# Patient Record
(demographics unavailable — no encounter records)

---

## 2024-11-19 NOTE — PLAN
[FreeTextEntry1] : collagenase, DD qd may need to consider skin graft. F/u- 1 wk  time spent 35 mins.

## 2024-11-19 NOTE — PHYSICAL EXAM
[JVD] : no jugular venous distention  [Normal Thyroid] : the thyroid was normal [Normal Breath Sounds] : Normal breath sounds [Normal Heart Sounds] : normal heart sounds [Normal Rate and Rhythm] : normal rate and rhythm [1+] : left 1+ [2+] : left 2+ [Ankle Swelling (On Exam)] : present [Ankle Swelling On The Right] : mild [Abdomen Masses] : No abdominal massess [Abdomen Tenderness] : ~T ~M No abdominal tenderness [Tender] : nontender [Enlarged] : not enlarged [Alert] : alert [Oriented to Person] : oriented to person [Oriented to Place] : oriented to place [Oriented to Time] : oriented to time [Calm] : calm [de-identified] : adult WM, NAD, alert, Ox3 [FreeTextEntry1] : Left anterior calf - gastrocnemius muscle [FreeTextEntry2] : 2.0 [FreeTextEntry3] : 4.2 [FreeTextEntry4] : 0.2 [de-identified] : Junie Fengnmshavon [de-identified] : 10% - Edges of 1 - 9 o'clock [de-identified] : 45% [de-identified] : 45% [de-identified] : Mechanically cleansed with sterile gauze and 0.9% normal saline. Dry Dressing  CIRCULATION -- Dorsalis Pedis: Doppler = R Palpable, Regular, 2+ L Palpable, Regular, 2+ -- Posterior Tibialis: Manual = R Palpable, Regular, 2+ L Palpable, Regular, 2+  Extremity Color: Pigmented Extremity Temperature: Cool Capillary Refill: < 3 seconds bilaterally [TWNoteComboBox4] : None [TWNoteComboBox5] : No [TWNoteComboBox6] : Traumatic [de-identified] : No [de-identified] : Erythema [de-identified] : None [de-identified] : False [de-identified] : Yes

## 2024-11-19 NOTE — VITALS
[] : No [de-identified] : "Very Sensitive" [FreeTextEntry3] : 10/10 [FreeTextEntry1] : "Not touching" [FreeTextEntry2] : "Touching it" [FreeTextEntry4] : Offload [FreeTextEntry5] : Initial Visit - Meds Reconciled

## 2024-11-19 NOTE — ASSESSMENT
[FreeTextEntry2] : Elevation & Low Sodium Compliance Infection Prevention Maintain Optimal Skin Integrity to High Pressure Areas Nutrition & Wound Healing Offloading the stress on skin structures and decreasing potential pathologic biomechanical influences. Promote & Restore Optimal Skin Integrity Protect & Guard Wound Site Wound Care (Dressing Changes) Vascular consult / Vascular studies. [FreeTextEntry3] : Initial Visit [FreeTextEntry4] : MD assessed wound and advised patient to use Santyl daily. Pt can shower daily. F/U 1 week
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
## 2024-11-26 NOTE — PHYSICAL EXAM
[Normal Thyroid] : the thyroid was normal [Normal Breath Sounds] : Normal breath sounds [Normal Heart Sounds] : normal heart sounds [Normal Rate and Rhythm] : normal rate and rhythm [Alert] : alert [Oriented to Person] : oriented to person [Oriented to Place] : oriented to place [Oriented to Time] : oriented to time [Calm] : calm [JVD] : no jugular venous distention  [Abdomen Masses] : No abdominal massess [Abdomen Tenderness] : ~T ~M No abdominal tenderness [Tender] : nontender [Enlarged] : not enlarged [de-identified] : adult WM, NAD, alert, Ox3. [4 x 4] : 4 x 4  [FreeTextEntry1] : Left posterior leg  [FreeTextEntry2] : 3.6 [FreeTextEntry3] : 1.8 [FreeTextEntry4] : 0.3 [de-identified] : Serous/sanguinous [de-identified] : Burn  [de-identified] : Mild  [de-identified] : 15% [de-identified] : collagenase  [de-identified] : Mechanically cleansed with sterile gauze and normal saline. Cloth tape  [TWNoteComboBox4] : Small [TWNoteComboBox6] : Other [de-identified] : Erythema [de-identified] : None [de-identified] : <20% [de-identified] : >75% [de-identified] : Yes [de-identified] : Daily [de-identified] : Primary Dressing

## 2024-11-26 NOTE — PLAN
[FreeTextEntry1] : collagenase, EDENILSON qd get authorization for sharp debridement. f/u 1 wk  time spent 25 mins.

## 2024-11-26 NOTE — VITALS
[Pain related to present condition?] : The patient's  pain is related to present condition. [] : No [de-identified] : 3/10  [FreeTextEntry3] : Left leg wound.  [FreeTextEntry1] : Protective dressing  [FreeTextEntry2] : Direct contact [FreeTextEntry4] : Protective dressing
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
## 2024-11-26 NOTE — ASSESSMENT
[Verbal] : Verbal [Written] : Written [Demo] : Demo [Patient] : Patient [Family member] : Family member [Good - alert, interested, motivated] : Good - alert, interested, motivated [Verbalizes knowledge/Understanding] : Verbalizes knowledge/understanding [Dressing changes] : dressing changes [Skin Care] : skin care [Signs and symptoms of infection] : sign and symptoms of infection [Nutrition] : nutrition [How and When to Call] : how and when to call [Pain Management] : pain management [Patient responsibility to plan of care] : patient responsibility to plan of care [] : Yes [FreeTextEntry2] : Infection prevention Localized wound care  Goal remaining pain free regarding wounds Enzymatic debridement   [FreeTextEntry4] : Auth submitted for sharps debridement  Patient has supplies at home and preforms his own dressing changes. follow up in 1 week  [Stable] : stable [Home] : Home [Ambulatory] : Ambulatory [Not Applicable - Long Term Care/Home Health Agency] : Long Term Care/Home Health Agency: Not Applicable

## 2024-12-03 NOTE — ASSESSMENT
[Verbal] : Verbal [Written] : Written [Demo] : Demo [Patient] : Patient [Family member] : Family member [Good - alert, interested, motivated] : Good - alert, interested, motivated [Verbalizes knowledge/Understanding] : Verbalizes knowledge/understanding [Dressing changes] : dressing changes [Skin Care] : skin care [Signs and symptoms of infection] : sign and symptoms of infection [Nutrition] : nutrition [How and When to Call] : how and when to call [Pain Management] : pain management [Patient responsibility to plan of care] : patient responsibility to plan of care [] : Yes [FreeTextEntry2] : Infection prevention Localized wound care  Goal remaining pain free regarding wounds Collagen matrix therapy  [FreeTextEntry4] : Small amount of supplies provided for patient. Supply order placed. Pt preforms his own dressing changes.  No debridement per MD due to improvement.  Follow up in 2 weeks  [Stable] : stable [Home] : Home [Ambulatory] : Ambulatory [Not Applicable - Long Term Care/Home Health Agency] : Long Term Care/Home Health Agency: Not Applicable

## 2024-12-03 NOTE — PHYSICAL EXAM
[Normal Thyroid] : the thyroid was normal [Normal Breath Sounds] : Normal breath sounds [Normal Heart Sounds] : normal heart sounds [Normal Rate and Rhythm] : normal rate and rhythm [Ankle Swelling (On Exam)] : present [Ankle Swelling On The Left] : of the left ankle [Ankle Swelling On The Right] : mild [Alert] : alert [Oriented to Person] : oriented to person [Oriented to Place] : oriented to place [Oriented to Time] : oriented to time [Calm] : calm [JVD] : no jugular venous distention  [Abdomen Masses] : No abdominal massess [Abdomen Tenderness] : ~T ~M No abdominal tenderness [Tender] : nontender [Enlarged] : not enlarged [de-identified] : adult WM, NAD, alert, Ox3. [4 x 4] : 4 x 4  [FreeTextEntry1] : Left posterior leg  [FreeTextEntry2] : 3.6 [FreeTextEntry3] : 1.8 [FreeTextEntry4] : 0.1 [de-identified] : Serous/sanguinous [de-identified] : Burn  [de-identified] : Mild  [de-identified] : 10% [de-identified] : Erin  [de-identified] : Mechanically cleansed with sterile gauze and normal saline. Cloth tape  [TWNoteComboBox4] : Small [TWNoteComboBox6] : Other [de-identified] : Erythema [de-identified] : None [de-identified] : None [de-identified] : >75% [de-identified] : Yes [de-identified] : Every other day [de-identified] : Primary Dressing
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
## 2024-12-17 NOTE — PHYSICAL EXAM
[4 x 4] : 4 x 4  [Normal Thyroid] : the thyroid was normal [Normal Breath Sounds] : Normal breath sounds [Normal Heart Sounds] : normal heart sounds [Normal Rate and Rhythm] : normal rate and rhythm [Alert] : alert [Oriented to Person] : oriented to person [Oriented to Place] : oriented to place [Oriented to Time] : oriented to time [Calm] : calm [JVD] : no jugular venous distention  [Abdomen Masses] : No abdominal massess [Abdomen Tenderness] : ~T ~M No abdominal tenderness [Tender] : nontender [Enlarged] : not enlarged [de-identified] : adult WM, NAD, alert, Ox3. [FreeTextEntry1] : Left posterior leg  [FreeTextEntry2] : 3.0 [FreeTextEntry3] : 1.8 [FreeTextEntry4] : 0.1 [de-identified] : Serosanguinous  [de-identified] : Burn  [de-identified] : Mild  [de-identified] : 90% [de-identified] : 10% [de-identified] : Erin  [de-identified] : Mechanically cleansed with sterile gauze and normal saline. Cloth tape  [TWNoteComboBox4] : Small [TWNoteComboBox5] : No [TWNoteComboBox6] : Other [de-identified] : No [de-identified] : Erythema [de-identified] : None [de-identified] : None [de-identified] : Yes [de-identified] : Every other day [de-identified] : Primary Dressing

## 2024-12-17 NOTE — ASSESSMENT
[Verbal] : Verbal [Written] : Written [Demo] : Demo [Patient] : Patient [Family member] : Family member [Good - alert, interested, motivated] : Good - alert, interested, motivated [Verbalizes knowledge/Understanding] : Verbalizes knowledge/understanding [Dressing changes] : dressing changes [Skin Care] : skin care [Signs and symptoms of infection] : sign and symptoms of infection [Nutrition] : nutrition [How and When to Call] : how and when to call [Pain Management] : pain management [Patient responsibility to plan of care] : patient responsibility to plan of care [Stable] : stable [Home] : Home [Ambulatory] : Ambulatory [Not Applicable - Long Term Care/Home Health Agency] : Long Term Care/Home Health Agency: Not Applicable [] : No [FreeTextEntry2] : Infection prevention Localized wound care  Goal remaining pain free regarding wounds Collagen matrix therapy  [FreeTextEntry4] : Pt is able to perform his own dressing changes, no supplies needed. Follow up in 2 weeks
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
## 2024-12-31 NOTE — PHYSICAL EXAM
[4 x 4] : 4 x 4  [Normal Thyroid] : the thyroid was normal [Normal Breath Sounds] : Normal breath sounds [Normal Heart Sounds] : normal heart sounds [Normal Rate and Rhythm] : normal rate and rhythm [Ankle Swelling Bilaterally] : bilaterally  [Alert] : alert [Oriented to Person] : oriented to person [Oriented to Place] : oriented to place [Oriented to Time] : oriented to time [Calm] : calm [JVD] : no jugular venous distention  [Ankle Swelling (On Exam)] : not present [Abdomen Masses] : No abdominal massess [Abdomen Tenderness] : ~T ~M No abdominal tenderness [Tender] : nontender [Enlarged] : not enlarged [de-identified] : adult WM, NAD, alert, Ox3. [2 x 2] : 2 x 2  [FreeTextEntry1] : Left posterior leg  [FreeTextEntry2] : 2.4 [FreeTextEntry3] : 1.4 [FreeTextEntry4] : 0.1 [de-identified] : Serosanguinous  [de-identified] : Burn  [de-identified] : Mild  [de-identified] : 90% [de-identified] : 10% [de-identified] : Erin  [de-identified] : Mechanically cleansed with sterile gauze and normal saline. Cloth tape  [TWNoteComboBox4] : Small [TWNoteComboBox5] : No [de-identified] : No [TWNoteComboBox6] : Other [de-identified] : Erythema [de-identified] : None [de-identified] : None [de-identified] : Yes [de-identified] : Every other day [de-identified] : Primary Dressing

## 2024-12-31 NOTE — ASSESSMENT
[Verbal] : Verbal [Demo] : Demo [Patient] : Patient [Family member] : Family member [Good - alert, interested, motivated] : Good - alert, interested, motivated [Verbalizes knowledge/Understanding] : Verbalizes knowledge/understanding [Dressing changes] : dressing changes [Skin Care] : skin care [Signs and symptoms of infection] : sign and symptoms of infection [Nutrition] : nutrition [How and When to Call] : how and when to call [Pain Management] : pain management [Patient responsibility to plan of care] : patient responsibility to plan of care [Stable] : stable [Home] : Home [Ambulatory] : Ambulatory [Not Applicable - Long Term Care/Home Health Agency] : Long Term Care/Home Health Agency: Not Applicable [] : No [FreeTextEntry2] : Infection prevention Localized wound care  Goal remaining pain free regarding wounds Collagen matrix therapy  [FreeTextEntry4] : Pt is able to perform his own dressing changes, no supplies needed. Follow up in 2 weeks

## 2024-12-31 NOTE — VITALS
[Pain related to present condition?] : The patient's  pain is not related to present condition. [] : No [de-identified] : 0/10